# Patient Record
Sex: FEMALE | Race: WHITE | HISPANIC OR LATINO | URBAN - METROPOLITAN AREA
[De-identification: names, ages, dates, MRNs, and addresses within clinical notes are randomized per-mention and may not be internally consistent; named-entity substitution may affect disease eponyms.]

---

## 2020-10-19 ENCOUNTER — OFFICE VISIT (OUTPATIENT)
Dept: URBAN - METROPOLITAN AREA CLINIC 76 | Facility: CLINIC | Age: 45
End: 2020-10-19
Payer: COMMERCIAL

## 2020-10-19 DIAGNOSIS — H52.13 MYOPIA, BILATERAL: Primary | ICD-10-CM

## 2020-10-19 PROCEDURE — 92004 COMPRE OPH EXAM NEW PT 1/>: CPT | Performed by: OPTOMETRIST

## 2020-10-19 ASSESSMENT — KERATOMETRY
OS: 47.13
OD: 47.00

## 2020-10-19 ASSESSMENT — INTRAOCULAR PRESSURE
OS: 11
OD: 12

## 2020-10-19 ASSESSMENT — VISUAL ACUITY
OS: 20/20
OD: 20/20

## 2020-10-19 NOTE — IMPRESSION/PLAN
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 Bilateral. Plan: Discussed condition. New mrx given today. Pt to call with any concerns.